# Patient Record
Sex: MALE | Race: WHITE | Employment: UNEMPLOYED | URBAN - NONMETROPOLITAN AREA
[De-identification: names, ages, dates, MRNs, and addresses within clinical notes are randomized per-mention and may not be internally consistent; named-entity substitution may affect disease eponyms.]

---

## 2017-01-09 ENCOUNTER — HOSPITAL ENCOUNTER (OUTPATIENT)
Dept: WOUND CARE | Age: 52
Discharge: HOME | End: 2017-01-09
Attending: PODIATRIST | Admitting: PODIATRIST

## 2017-01-09 VITALS
SYSTOLIC BLOOD PRESSURE: 134 MMHG | RESPIRATION RATE: 18 BRPM | DIASTOLIC BLOOD PRESSURE: 85 MMHG | HEART RATE: 94 BPM | TEMPERATURE: 97.8 F

## 2017-01-09 DIAGNOSIS — L97.412 SKIN ULCER OF RIGHT HEEL WITH FAT LAYER EXPOSED (HCC): Primary | ICD-10-CM

## 2017-01-09 DIAGNOSIS — R52 PAINFUL SCAR: ICD-10-CM

## 2017-01-09 DIAGNOSIS — Z47.89 AFTERCARE FOLLOWING SURGERY OF THE MUSCULOSKELETAL SYSTEM, NEC: ICD-10-CM

## 2017-01-09 DIAGNOSIS — L90.5 PAINFUL SCAR: ICD-10-CM

## 2017-01-09 ASSESSMENT — PAIN SCALES - GENERAL: PAINLEVEL_OUTOF10: 6

## 2017-01-09 ASSESSMENT — PAIN DESCRIPTION - PAIN TYPE: TYPE: CHRONIC PAIN;ACUTE PAIN

## 2017-01-09 ASSESSMENT — PAIN DESCRIPTION - FREQUENCY: FREQUENCY: CONTINUOUS

## 2017-01-09 ASSESSMENT — PAIN DESCRIPTION - ORIENTATION: ORIENTATION: RIGHT

## 2017-01-09 ASSESSMENT — PAIN DESCRIPTION - DESCRIPTORS: DESCRIPTORS: BURNING;ACHING

## 2017-01-09 ASSESSMENT — PAIN DESCRIPTION - LOCATION: LOCATION: FOOT

## 2017-01-16 ENCOUNTER — HOSPITAL ENCOUNTER (OUTPATIENT)
Dept: WOUND CARE | Age: 52
Discharge: HOME | End: 2017-01-16
Attending: PODIATRIST | Admitting: PODIATRIST

## 2017-01-16 VITALS
RESPIRATION RATE: 18 BRPM | SYSTOLIC BLOOD PRESSURE: 148 MMHG | HEART RATE: 75 BPM | TEMPERATURE: 96.2 F | DIASTOLIC BLOOD PRESSURE: 93 MMHG

## 2017-01-16 DIAGNOSIS — Z47.89 AFTERCARE FOLLOWING SURGERY OF THE MUSCULOSKELETAL SYSTEM, NEC: ICD-10-CM

## 2017-01-16 DIAGNOSIS — L90.5 PAINFUL SCAR: ICD-10-CM

## 2017-01-16 DIAGNOSIS — L97.412 SKIN ULCER OF RIGHT HEEL WITH FAT LAYER EXPOSED (HCC): Primary | ICD-10-CM

## 2017-01-16 DIAGNOSIS — R52 PAINFUL SCAR: ICD-10-CM

## 2017-01-16 ASSESSMENT — PAIN DESCRIPTION - ORIENTATION: ORIENTATION: RIGHT

## 2017-01-16 ASSESSMENT — PAIN DESCRIPTION - PAIN TYPE: TYPE: CHRONIC PAIN

## 2017-01-16 ASSESSMENT — PAIN DESCRIPTION - LOCATION: LOCATION: FOOT

## 2017-01-16 ASSESSMENT — PAIN SCALES - GENERAL: PAINLEVEL_OUTOF10: 5

## 2017-01-16 ASSESSMENT — PAIN DESCRIPTION - DESCRIPTORS: DESCRIPTORS: BURNING;ACHING

## 2017-01-16 ASSESSMENT — PAIN DESCRIPTION - FREQUENCY: FREQUENCY: CONTINUOUS

## 2017-01-30 ENCOUNTER — HOSPITAL ENCOUNTER (OUTPATIENT)
Dept: WOUND CARE | Age: 52
Discharge: HOME | End: 2017-01-30
Attending: PODIATRIST | Admitting: PODIATRIST

## 2017-01-30 VITALS
HEART RATE: 80 BPM | DIASTOLIC BLOOD PRESSURE: 86 MMHG | TEMPERATURE: 96 F | RESPIRATION RATE: 18 BRPM | SYSTOLIC BLOOD PRESSURE: 135 MMHG

## 2017-01-30 DIAGNOSIS — R52 PAINFUL SCAR: ICD-10-CM

## 2017-01-30 DIAGNOSIS — L90.5 PAINFUL SCAR: ICD-10-CM

## 2017-01-30 DIAGNOSIS — L97.412 SKIN ULCER OF RIGHT HEEL WITH FAT LAYER EXPOSED (HCC): Primary | ICD-10-CM

## 2017-01-30 DIAGNOSIS — Z47.89 AFTERCARE FOLLOWING SURGERY OF THE MUSCULOSKELETAL SYSTEM, NEC: ICD-10-CM

## 2017-01-30 ASSESSMENT — PAIN DESCRIPTION - DESCRIPTORS: DESCRIPTORS: ACHING;BURNING

## 2017-01-30 ASSESSMENT — PAIN DESCRIPTION - PAIN TYPE: TYPE: CHRONIC PAIN

## 2017-01-30 ASSESSMENT — PAIN DESCRIPTION - FREQUENCY: FREQUENCY: CONTINUOUS

## 2017-01-30 ASSESSMENT — PAIN SCALES - GENERAL: PAINLEVEL_OUTOF10: 3

## 2017-01-30 ASSESSMENT — PAIN DESCRIPTION - ORIENTATION: ORIENTATION: RIGHT

## 2017-02-06 ENCOUNTER — HOSPITAL ENCOUNTER (OUTPATIENT)
Dept: WOUND CARE | Age: 52
Discharge: HOME | End: 2017-02-06
Attending: PODIATRIST | Admitting: PODIATRIST

## 2017-02-06 VITALS — DIASTOLIC BLOOD PRESSURE: 86 MMHG | TEMPERATURE: 96 F | HEART RATE: 71 BPM | SYSTOLIC BLOOD PRESSURE: 133 MMHG

## 2017-02-06 DIAGNOSIS — L97.412 SKIN ULCER OF RIGHT HEEL WITH FAT LAYER EXPOSED (HCC): ICD-10-CM

## 2017-02-06 DIAGNOSIS — L90.5 PAINFUL SCAR: Primary | ICD-10-CM

## 2017-02-06 DIAGNOSIS — R52 PAINFUL SCAR: Primary | ICD-10-CM

## 2017-02-13 ENCOUNTER — HOSPITAL ENCOUNTER (OUTPATIENT)
Dept: WOUND CARE | Age: 52
Discharge: HOME | End: 2017-02-13
Attending: PODIATRIST | Admitting: PODIATRIST

## 2017-02-13 VITALS — DIASTOLIC BLOOD PRESSURE: 73 MMHG | TEMPERATURE: 97.3 F | HEART RATE: 80 BPM | SYSTOLIC BLOOD PRESSURE: 127 MMHG

## 2017-02-13 DIAGNOSIS — Z47.89 AFTERCARE FOLLOWING SURGERY OF THE MUSCULOSKELETAL SYSTEM, NEC: ICD-10-CM

## 2017-02-13 DIAGNOSIS — L90.5 PAINFUL SCAR: ICD-10-CM

## 2017-02-13 DIAGNOSIS — L97.412 SKIN ULCER OF RIGHT HEEL WITH FAT LAYER EXPOSED (HCC): Primary | ICD-10-CM

## 2017-02-13 DIAGNOSIS — R52 PAINFUL SCAR: ICD-10-CM

## 2017-02-20 ENCOUNTER — HOSPITAL ENCOUNTER (OUTPATIENT)
Dept: WOUND CARE | Age: 52
Discharge: HOME | End: 2017-02-20
Attending: PODIATRIST | Admitting: PODIATRIST

## 2017-02-20 VITALS
DIASTOLIC BLOOD PRESSURE: 78 MMHG | TEMPERATURE: 97.9 F | SYSTOLIC BLOOD PRESSURE: 120 MMHG | HEART RATE: 75 BPM | RESPIRATION RATE: 16 BRPM

## 2017-02-20 DIAGNOSIS — L97.412 SKIN ULCER OF RIGHT HEEL WITH FAT LAYER EXPOSED (HCC): ICD-10-CM

## 2017-02-20 DIAGNOSIS — Z47.89 AFTERCARE FOLLOWING SURGERY OF THE MUSCULOSKELETAL SYSTEM, NEC: ICD-10-CM

## 2017-02-20 DIAGNOSIS — L90.5 PAINFUL SCAR: Primary | ICD-10-CM

## 2017-02-20 DIAGNOSIS — R52 PAINFUL SCAR: Primary | ICD-10-CM

## 2017-02-20 ASSESSMENT — PAIN DESCRIPTION - PAIN TYPE: TYPE: ACUTE PAIN

## 2017-02-20 ASSESSMENT — PAIN DESCRIPTION - ONSET: ONSET: ON-GOING

## 2017-02-20 ASSESSMENT — PAIN DESCRIPTION - DESCRIPTORS: DESCRIPTORS: ACHING;BURNING

## 2017-02-20 ASSESSMENT — PAIN DESCRIPTION - LOCATION: LOCATION: FOOT

## 2017-02-20 ASSESSMENT — PAIN DESCRIPTION - ORIENTATION: ORIENTATION: RIGHT

## 2017-02-20 ASSESSMENT — PAIN DESCRIPTION - FREQUENCY: FREQUENCY: CONTINUOUS

## 2017-02-20 ASSESSMENT — PAIN SCALES - GENERAL: PAINLEVEL_OUTOF10: 3

## 2017-03-06 ENCOUNTER — HOSPITAL ENCOUNTER (OUTPATIENT)
Dept: WOUND CARE | Age: 52
Discharge: HOME | End: 2017-03-06
Attending: PODIATRIST | Admitting: PODIATRIST

## 2017-03-06 VITALS — HEART RATE: 82 BPM | RESPIRATION RATE: 16 BRPM | SYSTOLIC BLOOD PRESSURE: 139 MMHG | DIASTOLIC BLOOD PRESSURE: 83 MMHG

## 2017-03-06 DIAGNOSIS — L90.5 PAINFUL SCAR: ICD-10-CM

## 2017-03-06 DIAGNOSIS — Z47.89 AFTERCARE FOLLOWING SURGERY OF THE MUSCULOSKELETAL SYSTEM, NEC: ICD-10-CM

## 2017-03-06 DIAGNOSIS — L97.412 SKIN ULCER OF RIGHT HEEL WITH FAT LAYER EXPOSED (HCC): Primary | ICD-10-CM

## 2017-03-06 DIAGNOSIS — R52 PAINFUL SCAR: ICD-10-CM

## 2017-03-06 ASSESSMENT — PAIN DESCRIPTION - DESCRIPTORS: DESCRIPTORS: BURNING

## 2017-03-06 ASSESSMENT — PAIN SCALES - GENERAL: PAINLEVEL_OUTOF10: 3

## 2017-03-06 ASSESSMENT — PAIN DESCRIPTION - PAIN TYPE: TYPE: ACUTE PAIN;CHRONIC PAIN

## 2017-03-06 ASSESSMENT — PAIN DESCRIPTION - LOCATION: LOCATION: FOOT

## 2017-03-06 ASSESSMENT — PAIN DESCRIPTION - FREQUENCY: FREQUENCY: CONTINUOUS

## 2017-03-06 ASSESSMENT — PAIN DESCRIPTION - ONSET: ONSET: ON-GOING

## 2017-03-06 ASSESSMENT — PAIN DESCRIPTION - ORIENTATION: ORIENTATION: LEFT

## 2017-03-13 ENCOUNTER — HOSPITAL ENCOUNTER (OUTPATIENT)
Dept: WOUND CARE | Age: 52
Discharge: HOME | End: 2017-03-13
Attending: PODIATRIST | Admitting: PODIATRIST

## 2017-03-13 VITALS
DIASTOLIC BLOOD PRESSURE: 93 MMHG | SYSTOLIC BLOOD PRESSURE: 134 MMHG | RESPIRATION RATE: 18 BRPM | TEMPERATURE: 97.2 F | HEART RATE: 77 BPM

## 2017-03-13 DIAGNOSIS — R52 PAINFUL SCAR: Primary | ICD-10-CM

## 2017-03-13 DIAGNOSIS — L90.5 PAINFUL SCAR: Primary | ICD-10-CM

## 2017-03-13 DIAGNOSIS — L97.412 SKIN ULCER OF RIGHT HEEL WITH FAT LAYER EXPOSED (HCC): ICD-10-CM

## 2017-03-20 ENCOUNTER — HOSPITAL ENCOUNTER (OUTPATIENT)
Dept: WOUND CARE | Age: 52
Discharge: HOME | End: 2017-03-20
Attending: PODIATRIST | Admitting: PODIATRIST

## 2017-03-20 VITALS
DIASTOLIC BLOOD PRESSURE: 81 MMHG | HEART RATE: 80 BPM | RESPIRATION RATE: 20 BRPM | SYSTOLIC BLOOD PRESSURE: 120 MMHG | TEMPERATURE: 95.4 F

## 2017-03-20 DIAGNOSIS — R52 PAINFUL SCAR: Primary | ICD-10-CM

## 2017-03-20 DIAGNOSIS — Z47.89 AFTERCARE FOLLOWING SURGERY OF THE MUSCULOSKELETAL SYSTEM, NEC: ICD-10-CM

## 2017-03-20 DIAGNOSIS — L90.5 PAINFUL SCAR: Primary | ICD-10-CM

## 2017-03-20 DIAGNOSIS — L97.412 SKIN ULCER OF RIGHT HEEL WITH FAT LAYER EXPOSED (HCC): ICD-10-CM

## 2017-03-20 ASSESSMENT — PAIN DESCRIPTION - LOCATION: LOCATION: FOOT

## 2017-03-20 ASSESSMENT — PAIN SCALES - GENERAL: PAINLEVEL_OUTOF10: 3

## 2017-03-20 ASSESSMENT — PAIN DESCRIPTION - FREQUENCY: FREQUENCY: CONTINUOUS

## 2017-03-20 ASSESSMENT — PAIN DESCRIPTION - PROGRESSION: CLINICAL_PROGRESSION: NOT CHANGED

## 2017-03-20 ASSESSMENT — PAIN DESCRIPTION - DESCRIPTORS: DESCRIPTORS: BURNING

## 2017-03-20 ASSESSMENT — PAIN DESCRIPTION - PAIN TYPE: TYPE: ACUTE PAIN;CHRONIC PAIN

## 2017-03-20 ASSESSMENT — PAIN DESCRIPTION - ONSET: ONSET: ON-GOING

## 2017-03-20 ASSESSMENT — PAIN DESCRIPTION - ORIENTATION: ORIENTATION: LEFT

## 2017-08-14 ENCOUNTER — OFFICE VISIT (OUTPATIENT)
Dept: PULMONOLOGY | Age: 52
End: 2017-08-14
Payer: MEDICARE

## 2017-08-14 VITALS
BODY MASS INDEX: 43.41 KG/M2 | HEIGHT: 70 IN | WEIGHT: 303.2 LBS | DIASTOLIC BLOOD PRESSURE: 88 MMHG | OXYGEN SATURATION: 96 % | HEART RATE: 76 BPM | SYSTOLIC BLOOD PRESSURE: 136 MMHG

## 2017-08-14 DIAGNOSIS — G47.33 OSA (OBSTRUCTIVE SLEEP APNEA): Primary | ICD-10-CM

## 2017-08-14 PROCEDURE — 1036F TOBACCO NON-USER: CPT | Performed by: INTERNAL MEDICINE

## 2017-08-14 PROCEDURE — G8427 DOCREV CUR MEDS BY ELIG CLIN: HCPCS | Performed by: INTERNAL MEDICINE

## 2017-08-14 PROCEDURE — 3017F COLORECTAL CA SCREEN DOC REV: CPT | Performed by: INTERNAL MEDICINE

## 2017-08-14 PROCEDURE — G8419 CALC BMI OUT NRM PARAM NOF/U: HCPCS | Performed by: INTERNAL MEDICINE

## 2017-08-14 PROCEDURE — 99213 OFFICE O/P EST LOW 20 MIN: CPT | Performed by: INTERNAL MEDICINE

## 2017-08-14 RX ORDER — VIT C/B6/B5/MAGNESIUM/HERB 173 50-5-6-5MG
CAPSULE ORAL 2 TIMES DAILY
COMMUNITY

## 2017-08-15 ENCOUNTER — TELEPHONE (OUTPATIENT)
Dept: SLEEP CENTER | Age: 52
End: 2017-08-15

## 2017-08-20 ENCOUNTER — HOSPITAL ENCOUNTER (OUTPATIENT)
Dept: SLEEP CENTER | Age: 52
Discharge: HOME | End: 2017-08-20
Payer: MEDICARE

## 2017-08-20 DIAGNOSIS — G47.33 OSA (OBSTRUCTIVE SLEEP APNEA): ICD-10-CM

## 2017-08-20 PROCEDURE — 95811 POLYSOM 6/>YRS CPAP 4/> PARM: CPT

## 2017-08-21 LAB — STATUS: NORMAL

## 2017-08-28 DIAGNOSIS — G47.33 OSA (OBSTRUCTIVE SLEEP APNEA): Primary | ICD-10-CM

## 2017-08-31 ENCOUNTER — TELEPHONE (OUTPATIENT)
Dept: SLEEP CENTER | Age: 52
End: 2017-08-31

## 2017-11-07 ENCOUNTER — OFFICE VISIT (OUTPATIENT)
Dept: PULMONOLOGY | Age: 52
End: 2017-11-07
Payer: MEDICARE

## 2017-11-07 VITALS
DIASTOLIC BLOOD PRESSURE: 62 MMHG | HEART RATE: 63 BPM | HEIGHT: 70 IN | SYSTOLIC BLOOD PRESSURE: 110 MMHG | OXYGEN SATURATION: 97 % | WEIGHT: 314.2 LBS | BODY MASS INDEX: 44.98 KG/M2

## 2017-11-07 DIAGNOSIS — Z99.89 OSA ON CPAP: ICD-10-CM

## 2017-11-07 DIAGNOSIS — G47.33 OSA ON CPAP: ICD-10-CM

## 2017-11-07 PROCEDURE — 3017F COLORECTAL CA SCREEN DOC REV: CPT | Performed by: PHYSICIAN ASSISTANT

## 2017-11-07 PROCEDURE — G8427 DOCREV CUR MEDS BY ELIG CLIN: HCPCS | Performed by: PHYSICIAN ASSISTANT

## 2017-11-07 PROCEDURE — G8417 CALC BMI ABV UP PARAM F/U: HCPCS | Performed by: PHYSICIAN ASSISTANT

## 2017-11-07 PROCEDURE — 99213 OFFICE O/P EST LOW 20 MIN: CPT | Performed by: PHYSICIAN ASSISTANT

## 2017-11-07 PROCEDURE — G8484 FLU IMMUNIZE NO ADMIN: HCPCS | Performed by: PHYSICIAN ASSISTANT

## 2017-11-07 PROCEDURE — 1036F TOBACCO NON-USER: CPT | Performed by: PHYSICIAN ASSISTANT

## 2017-11-07 NOTE — PROGRESS NOTES
[place] [facility]      Mehran Garcia                                         [id number]  11/7/2017   Chief Complaint   Patient presents with    Sleep Apnea     2 month fu with download        Pt of Dr. Iram HAYWOOD Download:   Original or initial  AHI: 40.4     Date of initial study: 8/26/16    [x] Compliant  100%   []  Noncompliant 0 %     PAP Type CPAP Autoset   Level  8:20   Avg Hrs/Day 8:51  AHI: 1.0   Recorded compliance dates 9/25/17  to 10/24/17  Machine/Mfg: ResMed  Interface: Nasal mask    Provider:  []-HME  []Artur  []Angel    []Dominic         [][facility] [x]Other: Inell Orange    Neck Size: 22in. Mallampati 4  ESS:  4    Here is a scan of the most recent download:        Presentation:   Frank Ball presents for sleep medicine follow up for obstructive sleep apnea  Since the last visit, Frank Ball is doing reasonably well with their sleep machine. Other comments: His sleep is disrupted but improved. Mostly secondary to leg pain. He is using nasal mask. He has had some congestion and had trouble wearing PAP and would like to use a FFM. He feels like the pressure takes awhile to ramp up. Equipment issues: The pressure is  acceptable, the mask is acceptable and he  is  using the humidity. Sleep issues:  Do you feel better? Yes  More rested? Yes   Better concentration? yes    Progress History:   Since last visit any new medical issues? Yes polycythemia   New ER or hospitlal visits? No  Any new or changes in medicines? No  Any new sleep medicines?  No        Past Medical History:   Diagnosis Date    Aftercare following surgery of the musculoskeletal system, NEC 12/5/2016    Alcohol consumption binge drinking     Hypertension     Low testosterone     Obesity     Painful scar 12/5/2016    Skin ulcer of right heel with fat layer exposed ([address] 12/5/2016    Sleep apnea        Past Surgical History:   Procedure Laterality Date    ANKLE FRACTURE SURGERY      rt

## 2017-11-27 ENCOUNTER — TELEPHONE (OUTPATIENT)
Dept: PULMONOLOGY | Age: 52
End: 2017-11-27

## 2017-11-27 NOTE — TELEPHONE ENCOUNTER
11/07/017 progress note faxed to [facility] Khoi MCCARTY (fax confirmed)

## 2018-05-07 ENCOUNTER — OFFICE VISIT (OUTPATIENT)
Dept: PULMONOLOGY | Age: 53
End: 2018-05-07
Payer: MEDICARE

## 2018-05-07 VITALS
SYSTOLIC BLOOD PRESSURE: 128 MMHG | WEIGHT: 315 LBS | HEIGHT: 70 IN | BODY MASS INDEX: 45.1 KG/M2 | HEART RATE: 63 BPM | DIASTOLIC BLOOD PRESSURE: 76 MMHG | OXYGEN SATURATION: 95 %

## 2018-05-07 DIAGNOSIS — G47.33 OSA ON CPAP: Primary | ICD-10-CM

## 2018-05-07 DIAGNOSIS — Z99.89 OSA ON CPAP: Primary | ICD-10-CM

## 2018-05-07 PROCEDURE — 3017F COLORECTAL CA SCREEN DOC REV: CPT | Performed by: PHYSICIAN ASSISTANT

## 2018-05-07 PROCEDURE — 1036F TOBACCO NON-USER: CPT | Performed by: PHYSICIAN ASSISTANT

## 2018-05-07 PROCEDURE — 99213 OFFICE O/P EST LOW 20 MIN: CPT | Performed by: PHYSICIAN ASSISTANT

## 2018-05-07 PROCEDURE — G8417 CALC BMI ABV UP PARAM F/U: HCPCS | Performed by: PHYSICIAN ASSISTANT

## 2018-05-07 PROCEDURE — G8427 DOCREV CUR MEDS BY ELIG CLIN: HCPCS | Performed by: PHYSICIAN ASSISTANT

## 2018-05-07 ASSESSMENT — ENCOUNTER SYMPTOMS
GASTROINTESTINAL NEGATIVE: 1
EYES NEGATIVE: 1
COUGH: 0
SPUTUM PRODUCTION: 0
SORE THROAT: 0
RESPIRATORY NEGATIVE: 1
SHORTNESS OF BREATH: 0
HEARTBURN: 0
NAUSEA: 0
SINUS PAIN: 0
WHEEZING: 0
ORTHOPNEA: 0

## 2018-05-12 ENCOUNTER — NURSE TRIAGE (OUTPATIENT)
Dept: ADMINISTRATIVE | Age: 53
End: 2018-05-12

## 2019-05-06 NOTE — PROGRESS NOTES
[place] [facility] SleepMed[facility]      Cesar Matias         [id number]  5/7/2019   Chief Complaint   Patient presents with    Sleep Apnea     TOLU 12 mo f/u [facility]        Pt of Dr. Lucy Gordillo    PAP Download:   Original or initialAHI: 40.4     Date of initial study: 8/26/16      Compliant  98%     Noncompliant 2 %     PAP Type CPAPLevel  17   Avg Hrs/Day 12:15  AHI: 0.7   Recorded compliance dates , January 31, 2019  to April 30, 2019   Machine/Mfg: ResMed Interface: Nasal     Provider:    __SR-HME           Robert River        __ Salome Quentin    __ Daniela Melena            __[facility] __Adaptive   __Northwest:       [facility]    Neck Size: 22  Mallampati Mallampati 4  ESS:  4  SAQLI: 65    Here is a scan of the most recent download:            Presentation:   Maria Victoria Martin presents for sleep medicine follow up for obstructive sleep apnea  Since the last visit, Maria Victoria Martin is doing well with PAP. His mask is leaking but he is not having dry mouth. He feels rested. He is waking frequently from leak. Equipment issues: The pressure is  acceptable, the mask is acceptable and he  is  using the humidity. Sleep issues:  Do you feel better? Yes  More rested? Yes   Better concentration? yes    Progress History:   Since last visit any new medical issues? No  New ER or hospitlal visits? No  Any new or changes in medicines? No  Any new sleep medicines?  No        Past Medical History:   Diagnosis Date    Aftercare following surgery of the musculoskeletal system, NEC 12/5/2016    Alcohol consumption binge drinking     Hypertension     Low testosterone     Obesity     Painful scar 12/5/2016    Skin ulcer of right heel with fat layer exposed ([address] 12/5/2016    Sleep apnea        Past Surgical History:   Procedure Laterality Date    ANKLE FRACTURE SURGERY      rt leg 08, 09    OTHER SURGICAL HISTORY Right 12/02/2016    Revision of Painful Scar    SHOULDER SURGERY      dislocated 1983       Social History Tobacco Use    Smoking status: Former Smoker     Packs/day: 0.50     Years: 33.00     Pack years: 16.50     Start date: 4/25/1983     Last attempt to quit: 2/23/2016     Years since quitting: 3.2    Smokeless tobacco: Never Used    Tobacco comment: off and on smoker   Substance Use Topics    Alcohol use: Yes     Comment: quit 2-16-16    Drug use: Yes     Types: Marijuana       Allergies   Allergen Reactions    Molds & Smuts        Current Outpatient Medications   Medication Sig Dispense Refill    metFORMIN (GLUCOPHAGE) 1000 MG tablet Take 1,000 mg by mouth 2 times daily (with meals)      atorvastatin (LIPITOR) 20 MG tablet Take 20 mg by mouth daily      escitalopram (LEXAPRO) 20 MG tablet Take 20 mg by mouth daily      loratadine (CLARITIN) 10 MG capsule Take 10 mg by mouth daily      Cholecalciferol (D3-1000 PO) Take by mouth      Omega-3 Fatty Acids (FISH OIL) 1000 MG CAPS Take 3,000 mg by mouth 2 times daily      guaiFENesin 400 MG tablet Take 400 mg by mouth 4 times daily as needed for Cough      CPAP Machine MISC by Does not apply route Please change CPAP pressure to 17 cm H20. 1 each 0    Turmeric 500 MG CAPS Take by mouth 2 times daily       losartan (COZAAR) 50 MG tablet Take 50 mg by mouth daily      aspirin 325 MG tablet Take 325 mg by mouth daily      Multiple Vitamin (MULTI-VITAMIN PO) Take by mouth daily      sertraline (ZOLOFT) 50 MG tablet Take 25 mg by mouth daily       No current facility-administered medications for this visit. Family History   Problem Relation Age of Onset    Diabetes Mother     Heart Disease Mother     Anemia Sister     Mental Illness Brother         Review of Systems -   Review of Systems   Constitutional: Negative for activity change, appetite change, chills and fever. HENT: Negative for congestion and postnasal drip. Eyes: Negative. Respiratory: Negative for cough, chest tightness, shortness of breath, wheezing and stridor. airway pressure therapy with above described pressure. - He  advised to keep goodcompliance with current recommended pressure to get optimal results and clinical improvement  - Recommend 7-9 hours of sleep with PAP  - He was advised to call [employer] company regarding supplies if needed.   -He call my office for earlier appointment if needed for worsening of sleep symptoms.   - He was instructed on weight loss  - Ramiro Heard was educated about my impression and plan. Patient verbalizesunderstanding.   We will see Apolinar Roman back in: 1 year with download    Information added by my medical assistant/LPN was reviewed today         Marisela Allen PA-C, MPAS  5/7/2019

## 2019-05-07 ENCOUNTER — OFFICE VISIT (OUTPATIENT)
Dept: PULMONOLOGY | Age: 54
End: 2019-05-07
Payer: MEDICARE

## 2019-05-07 VITALS
HEIGHT: 70 IN | OXYGEN SATURATION: 94 % | BODY MASS INDEX: 45.1 KG/M2 | SYSTOLIC BLOOD PRESSURE: 120 MMHG | WEIGHT: 315 LBS | DIASTOLIC BLOOD PRESSURE: 84 MMHG | HEART RATE: 83 BPM

## 2019-05-07 DIAGNOSIS — Z99.89 OSA ON CPAP: Primary | ICD-10-CM

## 2019-05-07 DIAGNOSIS — E66.01 OBESITIES, MORBID (HCC): ICD-10-CM

## 2019-05-07 DIAGNOSIS — G47.33 OSA ON CPAP: Primary | ICD-10-CM

## 2019-05-07 PROCEDURE — 99213 OFFICE O/P EST LOW 20 MIN: CPT | Performed by: PHYSICIAN ASSISTANT

## 2019-05-07 PROCEDURE — G8427 DOCREV CUR MEDS BY ELIG CLIN: HCPCS | Performed by: PHYSICIAN ASSISTANT

## 2019-05-07 PROCEDURE — 3017F COLORECTAL CA SCREEN DOC REV: CPT | Performed by: PHYSICIAN ASSISTANT

## 2019-05-07 PROCEDURE — 1036F TOBACCO NON-USER: CPT | Performed by: PHYSICIAN ASSISTANT

## 2019-05-07 PROCEDURE — G8417 CALC BMI ABV UP PARAM F/U: HCPCS | Performed by: PHYSICIAN ASSISTANT

## 2019-05-07 RX ORDER — ESCITALOPRAM OXALATE 20 MG/1
20 TABLET ORAL DAILY
COMMUNITY
End: 2020-05-27

## 2019-05-07 RX ORDER — ATORVASTATIN CALCIUM 20 MG/1
20 TABLET, FILM COATED ORAL DAILY
COMMUNITY

## 2019-05-07 RX ORDER — GUAIFENESIN 400 MG/1
400 TABLET ORAL 4 TIMES DAILY PRN
COMMUNITY

## 2019-05-07 RX ORDER — CHLORAL HYDRATE 500 MG
3000 CAPSULE ORAL 2 TIMES DAILY
COMMUNITY

## 2019-05-07 RX ORDER — LORATADINE 10 MG/1
10 CAPSULE, LIQUID FILLED ORAL DAILY
COMMUNITY

## 2019-05-07 ASSESSMENT — ENCOUNTER SYMPTOMS
EYES NEGATIVE: 1
SHORTNESS OF BREATH: 0
NAUSEA: 0
STRIDOR: 0
CHEST TIGHTNESS: 0
ALLERGIC/IMMUNOLOGIC NEGATIVE: 1
DIARRHEA: 0
BACK PAIN: 0
COUGH: 0
WHEEZING: 0

## 2020-05-27 ENCOUNTER — OFFICE VISIT (OUTPATIENT)
Dept: PULMONOLOGY | Age: 55
End: 2020-05-27
Payer: MEDICARE

## 2020-05-27 VITALS
HEIGHT: 70 IN | WEIGHT: 313 LBS | DIASTOLIC BLOOD PRESSURE: 74 MMHG | BODY MASS INDEX: 44.81 KG/M2 | OXYGEN SATURATION: 98 % | SYSTOLIC BLOOD PRESSURE: 128 MMHG | HEART RATE: 73 BPM | TEMPERATURE: 98.3 F

## 2020-05-27 PROCEDURE — 1036F TOBACCO NON-USER: CPT | Performed by: PHYSICIAN ASSISTANT

## 2020-05-27 PROCEDURE — 99213 OFFICE O/P EST LOW 20 MIN: CPT | Performed by: PHYSICIAN ASSISTANT

## 2020-05-27 PROCEDURE — G8417 CALC BMI ABV UP PARAM F/U: HCPCS | Performed by: PHYSICIAN ASSISTANT

## 2020-05-27 PROCEDURE — G8427 DOCREV CUR MEDS BY ELIG CLIN: HCPCS | Performed by: PHYSICIAN ASSISTANT

## 2020-05-27 PROCEDURE — 3017F COLORECTAL CA SCREEN DOC REV: CPT | Performed by: PHYSICIAN ASSISTANT

## 2020-05-27 RX ORDER — HYDROCORTISONE ACETATE 0.5 %
1 CREAM (GRAM) TOPICAL 2 TIMES DAILY
COMMUNITY

## 2020-05-27 ASSESSMENT — ENCOUNTER SYMPTOMS
DIARRHEA: 0
NAUSEA: 0
CHEST TIGHTNESS: 0
SHORTNESS OF BREATH: 0
COUGH: 0
STRIDOR: 0
WHEEZING: 0
EYES NEGATIVE: 1
BACK PAIN: 0
ALLERGIC/IMMUNOLOGIC NEGATIVE: 1

## 2020-05-27 NOTE — PROGRESS NOTES
[place] [facility] SleepMed[facility]      Marva Vega         [id number]  5/27/2020   Chief Complaint   Patient presents with    Follow-up     TOLU 1yr f/u [facility]         Pt of Dr. Lucía Patino     PAP Download:   Sebastian Hu or initial AHI: 40.4     Date of initial study: 8/26/16      Compliant  98%     Noncompliant 1 %     PAP Type AirSense 10 Autoset Level  [id number]   Avg Hrs/Day 10hrs 13min  AHI: 0.8   Recorded compliance dates , 2/6/20-5/5/20  Machine/Mfg: ResMed  Interface: nasal    Provider:    __SR-HME           Mohinder Hutchins        __ Silvana Spine    __ Algie Isidoro            __[facility] __Adaptive   __Northwest:       _x_Other    Neck Size: 22  Mallampati Mallampati 4  ESS:  2      Here is a scan of the most recent download:            Presentation:   Lacy Bennett presents for sleep medicine follow up for obstructive sleep apnea  Since the last visit, Lacy Bennett is doing well with PAP. Complaining of mask leak. He is sleeping ok but having some insomnia related to family issues. He denies dry mouth. Equipment issues: The pressure is  acceptable, the mask is acceptable     Sleep issues:  Do you feel better? Yes and No  More rested? Sometimes   Better concentration? yes    Progress History:   Since last visit any new medical issues? No  New ER or hospitlal visits? No  Any new or changes in medicines? No  Any new sleep medicines?  No        Past Medical History:   Diagnosis Date    Aftercare following surgery of the musculoskeletal system, NEC 12/5/2016    Alcohol consumption binge drinking     Hypertension     Low testosterone     Obesity     Painful scar 12/5/2016    Polycythemia 2019    Skin ulcer of right heel with fat layer exposed ([address] 12/5/2016    Sleep apnea        Past Surgical History:   Procedure Laterality Date    ANKLE FRACTURE SURGERY      rt leg 08, 09    OTHER SURGICAL HISTORY Right 12/02/2016    Revision of Painful Scar    SHOULDER SURGERY      dislocated 1983       Social History Tobacco Use    Smoking status: Former Smoker     Packs/day: 0.50     Years: 33.00     Pack years: 16.50     Start date: 1983     Last attempt to quit: 2016     Years since quittin.2    Smokeless tobacco: Never Used    Tobacco comment: off and on smoker   Substance Use Topics    Alcohol use: Yes     Comment: quit 16    Drug use: Yes     Types: Marijuana       Allergies   Allergen Reactions    Molds & Smuts        Current Outpatient Medications   Medication Sig Dispense Refill    buPROPion HCl (WELLBUTRIN PO) Take 50 mg by mouth 2 times daily      Glucosamine-Chondroit-Vit C-Mn (GLUCOSAMINE CHONDR 1500 COMPLX) CAPS Take 1 tablet by mouth 2 times daily      metFORMIN (GLUCOPHAGE) 1000 MG tablet Take 1,000 mg by mouth 2 times daily (with meals)      atorvastatin (LIPITOR) 20 MG tablet Take 20 mg by mouth daily      loratadine (CLARITIN) 10 MG capsule Take 10 mg by mouth daily      Cholecalciferol (D3-1000 PO) Take 2,000 Units by mouth 2 times daily       Omega-3 Fatty Acids (FISH OIL) 1000 MG CAPS Take 3,000 mg by mouth 2 times daily      guaiFENesin 400 MG tablet Take 400 mg by mouth 4 times daily as needed for Cough      CPAP Machine MISC by Does not apply route Please change CPAP pressure to 17 cm H20. 1 each 0    Turmeric 500 MG CAPS Take by mouth 2 times daily       losartan (COZAAR) 50 MG tablet Take 50 mg by mouth daily      aspirin 325 MG tablet Take 325 mg by mouth daily      Multiple Vitamin (MULTI-VITAMIN PO) Take by mouth daily       No current facility-administered medications for this visit. Family History   Problem Relation Age of Onset    Diabetes Mother     Heart Disease Mother     Anemia Sister     Mental Illness Brother         Review of Systems -   Review of Systems   Constitutional: Negative for activity change, appetite change, chills and fever. HENT: Negative for congestion and postnasal drip. Eyes: Negative.     Respiratory: Negative for cough, chest tightness, shortness of breath, wheezing and stridor. Cardiovascular: Negative for chest pain and leg swelling. Gastrointestinal: Negative for diarrhea and nausea. Endocrine: Negative. Genitourinary: Negative. Musculoskeletal: Negative. Negative for arthralgias and back pain. Skin: Negative. Allergic/Immunologic: Negative. Neurological: Negative. Negative for dizziness and light-headedness. Psychiatric/Behavioral: Negative. All other systems reviewed and are negative. Physical Exam:    BMI:  Body mass index is 44.91 kg/m². Wt Readings from Last 3 Encounters:   05/27/20 (!) 313 lb (142 kg)   05/06/19 (!) 341 lb (154.7 kg)   05/07/18 (!) 322 lb (146.1 kg)     Weight lost 30 lbs over 1 year  Vitals: /74 (Site: Left Lower Arm, Position: Sitting, Cuff Size: Medium Adult)   Pulse 73   Temp 98.3 °F (36.8 °C) (Oral)   Ht 5' 10\" (1.778 m)   Wt (!) 313 lb (142 kg)   SpO2 98%   BMI 44.91 kg/m²       Physical Exam  Constitutional:       Appearance: He is well-developed. HENT:      Head: Normocephalic and atraumatic. Right Ear: External ear normal.      Left Ear: External ear normal.   Eyes:      Conjunctiva/sclera: Conjunctivae normal.      Pupils: Pupils are equal, round, and reactive to light. Neck:      Musculoskeletal: Normal range of motion and neck supple. Cardiovascular:      Rate and Rhythm: Normal rate and regular rhythm. Heart sounds: Normal heart sounds. Pulmonary:      Effort: Pulmonary effort is normal.      Breath sounds: Normal breath sounds. Musculoskeletal: Normal range of motion. Skin:     General: Skin is warm and dry. Neurological:      Mental Status: He is alert and oriented to person, place, and time. Psychiatric:         Behavior: Behavior normal.         Thought Content: Thought content normal.         Judgment: Judgment normal.           ASSESSMENT/DIAGNOSIS     Diagnosis Orders   1. TOLU on CPAP     2.  Morbid obesity

## 2020-11-30 ENCOUNTER — OFFICE VISIT (OUTPATIENT)
Dept: PULMONOLOGY | Age: 55
End: 2020-11-30
Payer: MEDICARE

## 2020-11-30 VITALS
OXYGEN SATURATION: 98 % | HEART RATE: 65 BPM | SYSTOLIC BLOOD PRESSURE: 134 MMHG | BODY MASS INDEX: 43.67 KG/M2 | HEIGHT: 70 IN | TEMPERATURE: 96.9 F | DIASTOLIC BLOOD PRESSURE: 78 MMHG | WEIGHT: 305 LBS

## 2020-11-30 PROCEDURE — 1036F TOBACCO NON-USER: CPT | Performed by: PHYSICIAN ASSISTANT

## 2020-11-30 PROCEDURE — G8417 CALC BMI ABV UP PARAM F/U: HCPCS | Performed by: PHYSICIAN ASSISTANT

## 2020-11-30 PROCEDURE — 3017F COLORECTAL CA SCREEN DOC REV: CPT | Performed by: PHYSICIAN ASSISTANT

## 2020-11-30 PROCEDURE — 99213 OFFICE O/P EST LOW 20 MIN: CPT | Performed by: PHYSICIAN ASSISTANT

## 2020-11-30 PROCEDURE — G8484 FLU IMMUNIZE NO ADMIN: HCPCS | Performed by: PHYSICIAN ASSISTANT

## 2020-11-30 PROCEDURE — G8427 DOCREV CUR MEDS BY ELIG CLIN: HCPCS | Performed by: PHYSICIAN ASSISTANT

## 2020-11-30 ASSESSMENT — ENCOUNTER SYMPTOMS
STRIDOR: 0
WHEEZING: 0
BACK PAIN: 0
EYES NEGATIVE: 1
NAUSEA: 0
ALLERGIC/IMMUNOLOGIC NEGATIVE: 1
DIARRHEA: 0
CHEST TIGHTNESS: 0
COUGH: 0
SHORTNESS OF BREATH: 0

## 2020-11-30 NOTE — PROGRESS NOTES
[place] [facility] Sleep [facility]      Jorge Alberto Skelton         [id number]  11/30/2020   Chief Complaint   Patient presents with    Follow-up     TOLU 6 month sleep follow up with Melchor Baez download         Pt of Dr. Jace Eid     PAP Download:   Original or initial AHI: 40.4      Date of initial study: 8/26/16      Compliant  100%     Noncompliant 0 %     PAP Type airsense 10 Level  17 cmh2o    Avg Hrs/Day 9:22  AHI: 0.6   Recorded compliance dates ,10/24/20-11/22/20  Machine/Mfg:   [x] ResMed    [] Respironics/Dreamstation   Interface:   [x] Nasal    [] Nasal pillows   [] FFM      Provider:      [] SR-HME     [][employer]     [] [employer]    [] [address]    [] [place]               [] [facility]      [] [employer]    [] [address]      [x] Other    Neck Size: 22  Mallampati Mallampati 4  ESS:  4  SAQLI: 84    Here is a scan of the most recent download:      Presentation:   Anamaria Wisdom presents for sleep medicine follow up for obstructive sleep apnea  Since the last visit, Anamaria Wisdom is doing well with PAP. His mask is fitting better. He is sleeping better. He feels rested. Equipment issues: The pressure is  acceptable, the mask is acceptable     Sleep issues:  Do you feel better? Yes  More rested? Yes   Better concentration? yes    Progress History:   Since last visit any new medical issues? No  New ER or hospitlal visits? No  Any new or changes in medicines? No  Any new sleep medicines?  No        Past Medical History:   Diagnosis Date    Aftercare following surgery of the musculoskeletal system, NEC 12/5/2016    Alcohol consumption binge drinking     Hypertension     Low testosterone     Obesity     Painful scar 12/5/2016    Polycythemia 2019    Skin ulcer of right heel with fat layer exposed ([address] 12/5/2016    Sleep apnea        Past Surgical History:   Procedure Laterality Date    ANKLE FRACTURE SURGERY      rt leg 08, 09    OTHER SURGICAL HISTORY Right 12/02/2016    Revision of Painful Scar  SHOULDER SURGERY      dislocated        Social History     Tobacco Use    Smoking status: Former Smoker     Packs/day: 0.50     Years: 33.00     Pack years: 16.50     Start date: 1983     Last attempt to quit: 2016     Years since quittin.7    Smokeless tobacco: Never Used    Tobacco comment: off and on smoker   Substance Use Topics    Alcohol use: Yes     Comment: quit 16    Drug use: Yes     Types: Marijuana       Allergies   Allergen Reactions    Molds & Smuts        Current Outpatient Medications   Medication Sig Dispense Refill    buPROPion HCl (WELLBUTRIN PO) Take 50 mg by mouth 2 times daily      Glucosamine-Chondroit-Vit C-Mn (GLUCOSAMINE CHONDR 1500 COMPLX) CAPS Take 1 tablet by mouth 2 times daily      metFORMIN (GLUCOPHAGE) 1000 MG tablet Take 1,000 mg by mouth 2 times daily (with meals)      atorvastatin (LIPITOR) 20 MG tablet Take 20 mg by mouth daily      loratadine (CLARITIN) 10 MG capsule Take 10 mg by mouth daily      Cholecalciferol (D3-1000 PO) Take 2,000 Units by mouth 2 times daily       Omega-3 Fatty Acids (FISH OIL) 1000 MG CAPS Take 3,000 mg by mouth 2 times daily      guaiFENesin 400 MG tablet Take 400 mg by mouth 4 times daily as needed for Cough      CPAP Machine MISC by Does not apply route Please change CPAP pressure to 17 cm H20. 1 each 0    Turmeric 500 MG CAPS Take by mouth 2 times daily       losartan (COZAAR) 50 MG tablet Take 50 mg by mouth daily      aspirin 325 MG tablet Take 325 mg by mouth daily      Multiple Vitamin (MULTI-VITAMIN PO) Take by mouth daily       No current facility-administered medications for this visit. Family History   Problem Relation Age of Onset    Diabetes Mother     Heart Disease Mother     Anemia Sister     Mental Illness Brother         Review of Systems -   Review of Systems   Constitutional: Negative for activity change, appetite change, chills and fever.    HENT: Negative for congestion and ASSESSMENT/DIAGNOSIS     Diagnosis Orders   1. TOLU on CPAP     2. Morbid obesity with BMI of 40.0-44.9, adult [facility])              Plan   Do you need any equipment today? Yes   - Download reviewed and discussed with patient  - He  was advised to continue current positive airway pressure therapy with above described pressure. - He  advised to keep good compliance with current recommended pressure to get optimal results and clinical improvement  - Recommend 7-9 hours of sleep with PAP  - He was advised to call [employer] company regarding supplies if needed.   -He call my office for earlier appointment if needed for worsening of sleep symptoms.   - He was instructed on weight loss  - Daysi England was educated about my impression and plan. Patient verbalizesunderstanding.   We will see Nazario Ramirez back in: 1 year with download    Information added by my medical assistant/LPN was reviewed today         Lakshmi Acosta PA-C, MPAS  11/30/2020

## 2021-11-29 ENCOUNTER — OFFICE VISIT (OUTPATIENT)
Dept: PULMONOLOGY | Age: 56
End: 2021-11-29
Payer: MEDICARE

## 2021-11-29 VITALS
TEMPERATURE: 97.8 F | HEART RATE: 86 BPM | OXYGEN SATURATION: 97 % | SYSTOLIC BLOOD PRESSURE: 136 MMHG | BODY MASS INDEX: 44.98 KG/M2 | HEIGHT: 70 IN | WEIGHT: 314.2 LBS | DIASTOLIC BLOOD PRESSURE: 72 MMHG

## 2021-11-29 DIAGNOSIS — Z99.89 OSA ON CPAP: Primary | ICD-10-CM

## 2021-11-29 DIAGNOSIS — G47.09 OTHER INSOMNIA: ICD-10-CM

## 2021-11-29 DIAGNOSIS — G47.33 OSA ON CPAP: Primary | ICD-10-CM

## 2021-11-29 DIAGNOSIS — E66.01 MORBID OBESITY WITH BMI OF 45.0-49.9, ADULT (HCC): ICD-10-CM

## 2021-11-29 PROCEDURE — 3017F COLORECTAL CA SCREEN DOC REV: CPT | Performed by: PHYSICIAN ASSISTANT

## 2021-11-29 PROCEDURE — 99213 OFFICE O/P EST LOW 20 MIN: CPT | Performed by: PHYSICIAN ASSISTANT

## 2021-11-29 PROCEDURE — G8417 CALC BMI ABV UP PARAM F/U: HCPCS | Performed by: PHYSICIAN ASSISTANT

## 2021-11-29 PROCEDURE — 1036F TOBACCO NON-USER: CPT | Performed by: PHYSICIAN ASSISTANT

## 2021-11-29 PROCEDURE — G8427 DOCREV CUR MEDS BY ELIG CLIN: HCPCS | Performed by: PHYSICIAN ASSISTANT

## 2021-11-29 PROCEDURE — G8484 FLU IMMUNIZE NO ADMIN: HCPCS | Performed by: PHYSICIAN ASSISTANT

## 2021-11-29 ASSESSMENT — ENCOUNTER SYMPTOMS
WHEEZING: 0
EYES NEGATIVE: 1
CHEST TIGHTNESS: 0
DIARRHEA: 0
SHORTNESS OF BREATH: 0
COUGH: 0
STRIDOR: 0
NAUSEA: 0
ALLERGIC/IMMUNOLOGIC NEGATIVE: 1
BACK PAIN: 0

## 2021-11-29 NOTE — PROGRESS NOTES
[place] [facility] Sleep [facility]      Saige Orlando         [id number]  11/29/2021   Chief Complaint   Patient presents with    Follow-up     iris 12 mo fu        Pt of Dr. Dawood Ballard    PAP Download:   Original or initial AHI: 40.4     Date of initial study: 8/26/2016      Compliant 100 %     Noncompliant 0 %     PAP Type Air sense Level  17   Avg Hrs/Day 9 hours and 10 min  AHI: 0.7   Recorded compliance dates , 10/27/21  to 11/25/21   Machine/Mfg:   [x] ResMed    [] Respironics/Dreamstation   Interface:   [x] Nasal    [] Nasal pillows   [] FFM      Provider:      [] SR-HME     [][employer]     [] [employer]    [] Roscoe Vides    [] [place]               [] [facility]      [] [employer]    [] [address]      [x] Other    Neck Size: 22 Mallampati Mallampati 4  ESS:  2  SAQLI: 74    Here is a scan of the most recent download:            Presentation:   Sydney Jacobsen presents for sleep medicine follow up for obstructive sleep apnea  Since the last visit, Sydney Jacobsen is doing well with PAP. He is staying with his brother and has not been sleeping as well. He has taken Melatonin in the past with some benefit. He feels mostly rested. Equipment issues: The pressure is  acceptable, the mask is acceptable     Sleep issues:  Do you feel better? Yes  More rested? Yes   Better concentration? yes    Progress History:   Since last visit any new medical issues? Yes foot wound  New ER or hospital visits? No  Any new or changes in medicines? No  Any new sleep medicines? No    Review of Systems -   Review of Systems   Constitutional: Negative for activity change, appetite change, chills and fever. HENT: Negative for congestion and postnasal drip. Eyes: Negative. Respiratory: Negative for cough, chest tightness, shortness of breath, wheezing and stridor. Cardiovascular: Negative for chest pain and leg swelling. Gastrointestinal: Negative for diarrhea and nausea. Endocrine: Negative. Genitourinary: Negative. Musculoskeletal: Negative. Negative for arthralgias and back pain. Skin: Negative. Allergic/Immunologic: Negative. Neurological: Negative. Negative for dizziness and light-headedness. Psychiatric/Behavioral: Negative. All other systems reviewed and are negative. Physical Exam:    BMI:  Body mass index is 45.08 kg/m². Wt Readings from Last 3 Encounters:   11/29/21 (!) 314 lb 3.2 oz (142.5 kg)   11/30/20 (!) 305 lb (138.3 kg)   05/27/20 (!) 313 lb (142 kg)     Weight stable / unchanged  Vitals: /72 (Site: Left Upper Arm, Position: Sitting, Cuff Size: Large Adult)   Pulse 86   Temp 97.8 °F (36.6 °C)   Ht 5' 10\" (1.778 m)   Wt (!) 314 lb 3.2 oz (142.5 kg)   SpO2 97% Comment: on r/a  BMI 45.08 kg/m²       Physical Exam  Constitutional:       Appearance: He is well-developed. HENT:      Head: Normocephalic and atraumatic. Right Ear: External ear normal.      Left Ear: External ear normal.   Eyes:      Conjunctiva/sclera: Conjunctivae normal.      Pupils: Pupils are equal, round, and reactive to light. Cardiovascular:      Rate and Rhythm: Normal rate and regular rhythm. Heart sounds: Normal heart sounds. Pulmonary:      Effort: Pulmonary effort is normal.      Breath sounds: Normal breath sounds. Musculoskeletal:         General: Normal range of motion. Cervical back: Normal range of motion and neck supple. Skin:     General: Skin is warm and dry. Neurological:      Mental Status: He is alert and oriented to person, place, and time. Psychiatric:         Behavior: Behavior normal.         Thought Content: Thought content normal.         Judgment: Judgment normal.           ASSESSMENT/DIAGNOSIS     Diagnosis Orders   1. TOLU on CPAP     2. Morbid obesity with BMI of 45.0-49.9, adult ([address]     3. Other insomnia              Plan   Do you need any equipment today?  Yes update supplies  - use Melatonin for insomnia  - If no improvement call office  - Reyna Ybarra reviewed and discussed with patient  - He  was advised to continue current positive airway pressure therapy with above described pressure. - He  advised to keep good compliance with current recommended pressure to get optimal results and clinical improvement  - Recommend 7-9 hours of sleep with PAP  - He was advised to call [employer] regarding supplies if needed.   -He call my office for earlier appointment if needed for worsening of sleep symptoms.   - He was instructed on weight loss  - Christine Korytanvir was educated about my impression and plan. Patient verbalizesunderstanding.   We will see Dania Torres back in: 1 year with download    Information added by my medical assistant/LPN was reviewed today         Jen Lee PA-C, MPAS  11/29/2021

## 2021-12-23 NOTE — PROGRESS NOTES
Left message with the following instructions:     Surgery Date: 12/27/21                                    Arrive at: [address]  Surgery time: 1115     If your arrival time is before 7 AM come in the emergency room entrance. If after 7 AM come in the main entrance. One visitor may accompany you to the hospital.  Both of you must wear a mask and be free of covid symptoms. 1. Do not eat or drink anything after midnight - unless instructed by your doctor prior to surgery. This includes                  no water, chewing gum or mints. 2. Follow your directions as prescribed by the doctor for your procedure and medications. Take the following medications with a small sip of water the morning of: none              3. Check with your Doctor regarding stopping Plavix, Coumadin, Lovenox, Effient, Pradaxa, Xarelto, Fragmin or other blood thinners and                  follow their instructions. 4. Do not smoke and do not drink any alcoholic beverages 24 hours prior to surgery. 5. You may brush your teeth and gargle the morning of surgery. DO NOT SWALLOW WATER  6. Please wear simple, loose fitting clothing to the hospital.  Plymouth Hendrickson not bring valuables (money, credit cards, checkbooks, etc.) Do not wear any                  makeup (including no eye makeup) or nail polish on your fingers or toes. 7.  DO NOT wear any jewelry or piercings on day of surgery. All body piercing jewelry must be removed. 8.  Take a shower the night before or morning of your procedure, do not apply any lotion, oil or powder. 9. If you have dentures, they will be removed before going to the OR; we will provide you a container. If you wear contact lenses or glasses,                 they will be removed; please bring a case for them. 10. If you  have a Living Will and Durable Power of  for Healthcare, please bring in a copy.            11. Please bring picture ID,  insurance card, paperwork from the doctors office    (H & P, Consent, & card for implantable devices). 12. You MUST make arrangements for a responsible adult to take you home after your surgery and be able to check on you every couple                  hours for the day. You will not be allowed to leave alone or drive yourself home. It is strongly suggested someone stay with you the first 24                  hrs. Your surgery will be cancelled if you do not have a ride home. 13. Wear a mask covering your nose & mouth when entering the hospital. Have your covid-19 test performed within 10 days of your                 surgery. Quarantine yourself after the test until after your surgery. Please call [phone] with any questions.

## 2021-12-27 ENCOUNTER — HOSPITAL ENCOUNTER (OUTPATIENT)
Age: 56
Setting detail: OUTPATIENT SURGERY
Discharge: HOME | End: 2021-12-27
Attending: PODIATRIST | Admitting: PODIATRIST
Payer: MEDICARE

## 2021-12-27 VITALS
RESPIRATION RATE: 18 BRPM | DIASTOLIC BLOOD PRESSURE: 71 MMHG | HEART RATE: 67 BPM | OXYGEN SATURATION: 96 % | TEMPERATURE: 98 F | BODY MASS INDEX: 46.21 KG/M2 | HEIGHT: 69 IN | WEIGHT: 312 LBS | SYSTOLIC BLOOD PRESSURE: 127 MMHG

## 2021-12-27 DIAGNOSIS — Z48.89 OTHER SPECIFIED AFTERCARE FOLLOWING SURGERY: Primary | ICD-10-CM

## 2021-12-27 LAB
GLUCOSE BLD-MCNC: 99 MG/DL (ref 70–99)
SARS-COV-2, NAAT: DETECTED
SOURCE: ABNORMAL

## 2021-12-27 PROCEDURE — 2709999900 HC NON-CHARGEABLE SUPPLY: Performed by: PODIATRIST

## 2021-12-27 PROCEDURE — 7100000010 HC PHASE II RECOVERY - FIRST 15 MIN: Performed by: PODIATRIST

## 2021-12-27 PROCEDURE — 3600000003 HC SURGERY LEVEL 3 BASE: Performed by: PODIATRIST

## 2021-12-27 PROCEDURE — 2780000010 HC IMPLANT OTHER: Performed by: PODIATRIST

## 2021-12-27 PROCEDURE — 7100000011 HC PHASE II RECOVERY - ADDTL 15 MIN: Performed by: PODIATRIST

## 2021-12-27 PROCEDURE — 82962 GLUCOSE BLOOD TEST: CPT

## 2021-12-27 PROCEDURE — 2500000003 HC RX 250 WO HCPCS: Performed by: PODIATRIST

## 2021-12-27 PROCEDURE — 87635 SARS-COV-2 COVID-19 AMP PRB: CPT

## 2021-12-27 PROCEDURE — 2580000003 HC RX 258: Performed by: PODIATRIST

## 2021-12-27 PROCEDURE — 3600000013 HC SURGERY LEVEL 3 ADDTL 15MIN: Performed by: PODIATRIST

## 2021-12-27 RX ORDER — CEPHALEXIN 500 MG/1
500 CAPSULE ORAL 2 TIMES DAILY
Qty: 20 CAPSULE | Refills: 0 | Status: SHIPPED | OUTPATIENT
Start: 2021-12-27

## 2021-12-27 RX ORDER — HYDROCODONE BITARTRATE AND ACETAMINOPHEN 5; 325 MG/1; MG/1
1 TABLET ORAL EVERY 4 HOURS PRN
Qty: 30 TABLET | Refills: 0 | Status: SHIPPED | OUTPATIENT
Start: 2021-12-27 | End: 2022-01-03

## 2021-12-27 RX ORDER — BUPIVACAINE HYDROCHLORIDE AND EPINEPHRINE 5; 5 MG/ML; UG/ML
INJECTION, SOLUTION EPIDURAL; INTRACAUDAL; PERINEURAL
Status: COMPLETED | OUTPATIENT
Start: 2021-12-27 | End: 2021-12-27

## 2021-12-27 RX ORDER — SODIUM CHLORIDE, SODIUM LACTATE, POTASSIUM CHLORIDE, CALCIUM CHLORIDE 600; 310; 30; 20 MG/100ML; MG/100ML; MG/100ML; MG/100ML
INJECTION, SOLUTION INTRAVENOUS CONTINUOUS
Status: DISCONTINUED | OUTPATIENT
Start: 2021-12-27 | End: 2021-12-27

## 2021-12-27 RX ORDER — SODIUM CHLORIDE 0.9 % (FLUSH) 0.9 %
5-40 SYRINGE (ML) INJECTION PRN
Status: DISCONTINUED | OUTPATIENT
Start: 2021-12-27 | End: 2021-12-27 | Stop reason: HOSPADM

## 2021-12-27 RX ADMIN — SODIUM CHLORIDE, PRESERVATIVE FREE 10 ML: 5 INJECTION INTRAVENOUS at 11:03

## 2021-12-27 ASSESSMENT — PAIN SCALES - GENERAL
PAINLEVEL_OUTOF10: 0
PAINLEVEL_OUTOF10: 0

## 2021-12-27 NOTE — BRIEF OP NOTE
Brief Postoperative Note      Patient: Sabrina Rodriguez  YOB: 1965  MRN: [id number]    Date of Procedure: 12/27/2021    Pre-Op Diagnosis: Type 2 diabetes mellitus with diabetic polyneuropathy, unspecified whether long term insulin use ([facility] [address] [E11.42] Ulcer of right heel, with fat layer exposed ([facility] [address] [L97.412]    Post-Op Diagnosis: Same       Procedure(s):  RIGHT PARTIAL EXCISION OF BONE CALCANEUS    Surgeon(s):  Pool Lawrence DPM    Assistant:  * No surgical staff found *    Anesthesia: Local    Estimated Blood Loss (mL): Minimal    Complications: None    Specimens:   * No specimens in log *    Implants:  * No implants in log *      Drains: * No LDAs found *    Findings: partial calcaneal excision without complication, ulceration offloaded, bleeding controlled    Electronically signed by Pool Lawrence DPM on 12/27/2021 at 1:34 PM

## 2021-12-27 NOTE — PROGRESS NOTES
1338- pt. Arrived to [facility]2 via surgical cart. Brakes applied. Pt. Had local anesthetic. Report received from [facility]. Pt. Dressing to right foot is clean dry and intact. He denies pain or n/v at this time. He states does not want anything to drink at this time. Pt. Currently on RA. Pt. Has a saline lock. He denies any needs or questions at this time.

## 2021-12-27 NOTE — PROGRESS NOTES
1450- pt. Is alert and orientedx4. Per Dr. DESIR [facility] pt. Is able to drive himself home. Pt. Is on RA and denies pain or n/v. Dressing to right foot is clean dry and intact. Written and verbal instructions given to patient. He denied any other questions or concerns. Pt. Was sent home with crutches and a surgical shoe. IV removed without complications. Pt. Was taken to main entrance via wheelchair and then was able to use his crutches and walk to his car.

## 2021-12-28 NOTE — OP NOTE
[facility]                  [address], [address]                                OPERATIVE REPORT    PATIENT NAME: Joe Wang                    :        1965  MED REC NO:   [id number]                          ROOM:  ACCOUNT NO:   [de-identified]                           ADMIT DATE: 2021  PROVIDER:     Zeinab Byers DPM    DATE OF PROCEDURE:  2021    SURGEON:  Zeinab Byers DPM    PREOPERATIVE DIAGNOSES:  1. Diabetes mellitus type 2 with polyneuropathy. 2.  Ulcer of right heel with fat layer exposed. 3.  Deformity of right heel. POSTOPERATIVE DIAGNOSES:  1. Diabetes mellitus type 2 with polyneuropathy. 2.  Ulcer of right heel with fat layer exposed. 3.  Deformity of right heel. PROCEDURES:  Partial ostectomy of right calcaneus. PATHOLOGY:  Zero. ANESTHESIA:  Local.    HEMOSTASIS:  Pneumatic ankle tourniquet at 250 mmHg. ESTIMATED BLOOD LOSS:  Less than 10 mL. MATERIALS:  Nylon. INJECTABLES:  10 mL of 0.5% Marcaine with epinephrine preoperative  block. Postoperative block 6 mL 1% lidocaine plain 0.5% Marcaine plain  in a one-to-one mix. COMPLICATIONS:  Zero. INDICATIONS FOR PROCEDURE:  The patient is a pleasant 77-year-old  diabetic male with longstanding chronic ulceration to the right heel. The patient had previous rotational flap closure, which did fully heal;  however, secondary to deformity of the heel bone itself with noted  prominence to the plantar medial posterior calcaneus, ulceration did  recur. The patient did wish for removal, minimally invasive. Clinical  and radiographic evaluation did correlate the above diagnosis. Consent  signed and reviewed in the chart. No guarantees were made as to the  outcome of the procedure.   All the patient's questions and concerns were  answered prior to the start of the procedure and the patient wished to  proceed with the procedure at this time.    PROCEDURE IN DETAIL:  The patient was brought to the operating room,  placed on the operating table in the supine position. A well-padded  pneumatic ankle tourniquet was placed about the patient's right ankle. Following tourniquet application, the right lower extremity was  scrubbed, prepped and draped in the usual aseptic manner. Right leg was  elevated. Pneumatic ankle tourniquet inflated to 250 mmHg. Attention was directed to the patient's right posterior heel where 10 mL  of 0.5% Marcaine with epinephrine was introduced in ring block fashion  around the ulceration in question. Two stab incisions were then created  at approximately 10 o'clock position and the 2 o'clock position, carried  directly to bone. The Tenex system was then introduced into the 10  o'clock position and debridement of the area inferior to the ulceration  did occur. Significant scar tissue release was noted with removal of  bony fragments. It was removed and then placed in the 2 o'clock  position where the process did repeat. This process occurred  repetitively several times over the next four minutes of debridement  time. After four minutes of debridement, the area was palpated and no  prominence was noted inferior to the ulceration. The skin did move  freely and significant scar tissue reduction was noted. The patient did  not have any pain at this time. The area was then dressed with Aquacel  AG, covered with a sterile compressive dressing consisting of 4 x 4s,  Kerlix and a 4-inch Ace bandage. The pneumatic ankle tourniquet was  deflated and a prompt hyperemic response was noted to all digits of the  patient's right foot. The patient tolerated the procedure and  anesthesia well. He was transferred to PACU with vital signs stable and  vascular status intact to the right foot.   After a brief period of  postoperative monitoring, the patient will be discharged home with  written and oral postoperative instructions. Remain strict  non-weightbearing to the right foot, taking all pain medication and  antibiotics as instructed. Any postoperative complications, please call  my office and if my office is unable to reach, please have me paged  directly at my office on-call number.         Job Henry    D: 12/27/2021 13:51:48       T: 12/27/2021 16:35:35     [email or web address]  Job#: [id number]     Doc#: [id number]    CC:

## 2022-11-23 NOTE — PROGRESS NOTES
[facility]      Олег Larose         [id number]  11/28/2022   Chief Complaint   Patient presents with    Follow-up     1yr TOLU f/u w/[employer] download. Had PSG 8/28/16 and 8/20/17 (printed). Notes an 80lb weight loss. \"I don't sleep for shit. I'm not sleeping well. \"  Has tried OTC sleep aids w/o success. Notes he tosses and turns all night. Pt of Dr. Montelongo Gamma     PAP Download:   Kwame Vang or initial AHI: 40.4     Date of initial study: 8/26/2016     Compliant  97%     Noncompliant 3 %     PAP Type CPAP   Level  14clK8L   Avg Hrs/Day 8hrs 25mins  AHI: 7.7   Recorded compliance dates , 10/15/22  to 11/14/22   Machine/Mfg:   [x] ResMed    [] Respironics/Dreamstation   Interface:   [x] Nasal    [] Nasal pillows   [] FFM      Provider:      [] -HME     []Artur     [x] Angel    [] Js Pena    [] Polo[place]ermans               [] [employer]      [] Adaptive    [] [address]      [] Other    Neck Size: 19  Mallampati 3  ESS:  4  SAQLI: 74    Here is a scan of the most recent download:            Presentation:   Reinaldo Mendieta presents for sleep medicine follow up for obstructive sleep apnea  Since the last visit, Reinaldo Mendieta is doing ok with PAP. AHI is elevated with both obstructive and central events. He has lost nearly 90 lbs this past year. He is struggling with insomnia. He feels stress is contributing. Mask is leaking also. Equipment issues: The pressure is  acceptable, the mask is unacceptable     Sleep issues:  Do you feel better? Yes  More rested? Yes   Better concentration? yes    Progress History:   Since last visit any new medical issues? No  New ER or hospital visits? No  Any new or changes in medicines? No  Any new sleep medicines? No    Review of Systems -   Review of Systems   Constitutional:  Positive for unexpected weight change. Negative for activity change, appetite change, chills and fever. HENT:  Negative for congestion and postnasal drip.     Eyes: Negative. Respiratory:  Negative for cough, chest tightness, shortness of breath, wheezing and stridor. Cardiovascular:  Negative for chest pain and leg swelling. Gastrointestinal:  Negative for diarrhea and nausea. Endocrine: Negative. Genitourinary: Negative. Musculoskeletal: Negative. Negative for arthralgias and back pain. Skin: Negative. Allergic/Immunologic: Negative. Neurological: Negative. Negative for dizziness and light-headedness. Psychiatric/Behavioral: Negative. All other systems reviewed and are negative. Physical Exam:    BMI:  Body mass index is 33.29 kg/m². Wt Readings from Last 3 Encounters:   11/28/22 225 lb 6.4 oz (102.2 kg)   12/23/21 (!) 312 lb (141.5 kg)   11/29/21 (!) 314 lb 3.2 oz (142.5 kg)     Weight lost 90 lbs over 1 year  Vitals: /82 (Site: Left Upper Arm, Position: Sitting, Cuff Size: Medium Adult)   Pulse 76   Temp 98.2 °F (36.8 °C) (Oral)   Ht 5' 9\" (1.753 m)   Wt 225 lb 6.4 oz (102.2 kg)   SpO2 97% Comment: r/a  BMI 33.29 kg/m²       Physical Exam  Constitutional:       Appearance: Normal appearance. He is normal weight. HENT:      Head: Normocephalic and atraumatic. Right Ear: External ear normal.      Left Ear: External ear normal.      Nose: Nose normal.   Eyes:      Extraocular Movements: Extraocular movements intact. Conjunctiva/sclera: Conjunctivae normal.      Pupils: Pupils are equal, round, and reactive to light. Pulmonary:      Effort: Pulmonary effort is normal.   Musculoskeletal:      Cervical back: Normal range of motion and neck supple. Neurological:      General: No focal deficit present. Mental Status: He is alert and oriented to person, place, and time. Psychiatric:         Attention and Perception: Attention and perception normal.         Mood and Affect: Mood and affect normal.         Speech: Speech normal.         Behavior: Behavior normal. Behavior is cooperative.          Thought Content: Thought content normal.         Cognition and Memory: Cognition normal.         Judgment: Judgment normal.         ASSESSMENT/DIAGNOSIS     Diagnosis Orders   1. TOLU on CPAP        2. Obesity (BMI 30-39.9)        3. Other insomnia               Plan   Do you need any equipment today? Yes update supplies  - will try auto to improve AHI given weight loss  - Will try trazodone  - needs to follow up with PCP regarding weight loss   - Download reviewed and discussed with patient  - He  was advised to continue current positive airway pressure therapy with above described pressure. - He  advised to keep good compliance with current recommended pressure to get optimal results and clinical improvement  - Recommend 7-9 hours of sleep with PAP  - He was advised to call [employer] regarding supplies if needed.   -He call my office for earlier appointment if needed for worsening of sleep symptoms.   - He was instructed on weight loss  - Joel Abreu was educated about my impression and plan. Patient verbalizesunderstanding.   We will see Gianna Phillips back in: 8 weeks with download    Information added by my medical assistant/LPN was reviewed today       Criselda Montano, [address] [facility] for pulmonary and Sleep Medicine  11/28/2022

## 2022-11-28 ENCOUNTER — OFFICE VISIT (OUTPATIENT)
Dept: PULMONOLOGY | Age: 57
End: 2022-11-28
Payer: MEDICARE

## 2022-11-28 VITALS
OXYGEN SATURATION: 97 % | HEIGHT: 69 IN | DIASTOLIC BLOOD PRESSURE: 82 MMHG | SYSTOLIC BLOOD PRESSURE: 122 MMHG | HEART RATE: 76 BPM | WEIGHT: 225.4 LBS | BODY MASS INDEX: 33.38 KG/M2 | TEMPERATURE: 98.2 F

## 2022-11-28 DIAGNOSIS — G47.09 OTHER INSOMNIA: ICD-10-CM

## 2022-11-28 DIAGNOSIS — E66.9 OBESITY (BMI 30-39.9): ICD-10-CM

## 2022-11-28 DIAGNOSIS — Z99.89 OSA ON CPAP: Primary | ICD-10-CM

## 2022-11-28 DIAGNOSIS — G47.33 OSA ON CPAP: Primary | ICD-10-CM

## 2022-11-28 PROCEDURE — G8427 DOCREV CUR MEDS BY ELIG CLIN: HCPCS | Performed by: PHYSICIAN ASSISTANT

## 2022-11-28 PROCEDURE — 3017F COLORECTAL CA SCREEN DOC REV: CPT | Performed by: PHYSICIAN ASSISTANT

## 2022-11-28 PROCEDURE — 1036F TOBACCO NON-USER: CPT | Performed by: PHYSICIAN ASSISTANT

## 2022-11-28 PROCEDURE — G8417 CALC BMI ABV UP PARAM F/U: HCPCS | Performed by: PHYSICIAN ASSISTANT

## 2022-11-28 PROCEDURE — G8484 FLU IMMUNIZE NO ADMIN: HCPCS | Performed by: PHYSICIAN ASSISTANT

## 2022-11-28 PROCEDURE — 99214 OFFICE O/P EST MOD 30 MIN: CPT | Performed by: PHYSICIAN ASSISTANT

## 2022-11-28 RX ORDER — TRAZODONE HYDROCHLORIDE 50 MG/1
TABLET ORAL
Qty: 60 TABLET | Refills: 2 | Status: SHIPPED | OUTPATIENT
Start: 2022-11-28

## 2022-11-28 ASSESSMENT — ENCOUNTER SYMPTOMS
DIARRHEA: 0
CHEST TIGHTNESS: 0
WHEEZING: 0
STRIDOR: 0
EYES NEGATIVE: 1
SHORTNESS OF BREATH: 0
BACK PAIN: 0
ALLERGIC/IMMUNOLOGIC NEGATIVE: 1
COUGH: 0
NAUSEA: 0

## 2023-01-31 ENCOUNTER — OFFICE VISIT (OUTPATIENT)
Dept: PULMONOLOGY | Age: 58
End: 2023-01-31
Payer: MEDICARE

## 2023-01-31 VITALS
HEIGHT: 74 IN | OXYGEN SATURATION: 96 % | SYSTOLIC BLOOD PRESSURE: 126 MMHG | WEIGHT: 221.4 LBS | BODY MASS INDEX: 28.42 KG/M2 | DIASTOLIC BLOOD PRESSURE: 66 MMHG | TEMPERATURE: 97.9 F | HEART RATE: 86 BPM

## 2023-01-31 DIAGNOSIS — G47.09 OTHER INSOMNIA: ICD-10-CM

## 2023-01-31 DIAGNOSIS — Z99.89 OSA ON CPAP: Primary | ICD-10-CM

## 2023-01-31 DIAGNOSIS — G47.33 OSA ON CPAP: Primary | ICD-10-CM

## 2023-01-31 PROCEDURE — G8484 FLU IMMUNIZE NO ADMIN: HCPCS | Performed by: PHYSICIAN ASSISTANT

## 2023-01-31 PROCEDURE — 99213 OFFICE O/P EST LOW 20 MIN: CPT | Performed by: PHYSICIAN ASSISTANT

## 2023-01-31 PROCEDURE — G8417 CALC BMI ABV UP PARAM F/U: HCPCS | Performed by: PHYSICIAN ASSISTANT

## 2023-01-31 PROCEDURE — 3017F COLORECTAL CA SCREEN DOC REV: CPT | Performed by: PHYSICIAN ASSISTANT

## 2023-01-31 PROCEDURE — 1036F TOBACCO NON-USER: CPT | Performed by: PHYSICIAN ASSISTANT

## 2023-01-31 PROCEDURE — G8427 DOCREV CUR MEDS BY ELIG CLIN: HCPCS | Performed by: PHYSICIAN ASSISTANT

## 2023-01-31 RX ORDER — TRAZODONE HYDROCHLORIDE 50 MG/1
TABLET ORAL
Qty: 60 TABLET | Refills: 2 | Status: SHIPPED | OUTPATIENT
Start: 2023-01-31

## 2023-01-31 ASSESSMENT — ENCOUNTER SYMPTOMS
BACK PAIN: 0
STRIDOR: 0
COUGH: 0
EYES NEGATIVE: 1
NAUSEA: 0
SHORTNESS OF BREATH: 0
CHEST TIGHTNESS: 0
ALLERGIC/IMMUNOLOGIC NEGATIVE: 1
WHEEZING: 0
DIARRHEA: 0

## 2023-01-31 NOTE — PROGRESS NOTES
[facility]      River Rodriguezport         [id number]  1/31/2023   Chief Complaint   Patient presents with    Follow-up     8wk TOLU f/u w/[employer] download. Doing \"ok. \"        Pt of Dr. Jo-Ann Gold     PAP Download:   Alka Blount or initial AHI: 40.4     Date of initial study: 8/26/2016       Compliant  77%     Noncompliant 23 %     PAP Type APAP   Level  10/91sfR1O   Avg Hrs/Day 9hrs 21mins  AHI: 5.6   Recorded compliance dates , 12/25/22  to 1/23/23   Machine/Mfg:   [x] ResMed    [] Respironics/Dreamstation   Interface:   [x] Nasal    [] Nasal pillows   [] FFM      Provider:      [] -AVI     []Artur     [x] Angel    [] Thomes Arn    [] Therese               [] [employer]      [] Adaptive    [] [address]      [] Other    Neck Size: 18  Mallampati 3  ESS:  3  SAQLI: 95    Here is a scan of the most recent download:            Presentation:   Diane Barroso presents for sleep medicine follow up for obstructive sleep apnea  Since the last visit, Diane Barroso is doing well with PAP. He is taking trazodone with some benefit. AHI improved with pressure adjustment. Equipment issues: The pressure is  acceptable, the mask is acceptable     Sleep issues:  Do you feel better? Yes  More rested? Yes   Better concentration? yes    Progress History:   Since last visit any new medical issues? No  New ER or hospital visits? No  Any new or changes in medicines? No  Any new sleep medicines? No    Review of Systems -   Review of Systems   Constitutional:  Negative for activity change, appetite change, chills and fever. HENT:  Negative for congestion and postnasal drip. Eyes: Negative. Respiratory:  Negative for cough, chest tightness, shortness of breath, wheezing and stridor. Cardiovascular:  Negative for chest pain and leg swelling. Gastrointestinal:  Negative for diarrhea and nausea. Endocrine: Negative. Genitourinary: Negative. Musculoskeletal: Negative.   Negative for arthralgias and back pain. Skin: Negative. Allergic/Immunologic: Negative. Neurological: Negative. Negative for dizziness and light-headedness. Psychiatric/Behavioral: Negative. All other systems reviewed and are negative. Physical Exam:    BMI:  Body mass index is 28.43 kg/m². Wt Readings from Last 3 Encounters:   01/31/23 221 lb 6.4 oz (100.4 kg)   11/28/22 225 lb 6.4 oz (102.2 kg)   12/23/21 (!) 312 lb (141.5 kg)     Weight stable / unchanged  Vitals: /66 (Site: Right Upper Arm, Position: Sitting, Cuff Size: Large Adult)   Pulse 86   Temp 97.9 °F (36.6 °C) (Oral)   Ht 6' 2\" (1.88 m)   Wt 221 lb 6.4 oz (100.4 kg)   SpO2 96% Comment: r/a  BMI 28.43 kg/m²       Physical Exam  Constitutional:       Appearance: Normal appearance. He is normal weight. HENT:      Head: Normocephalic and atraumatic. Right Ear: External ear normal.      Left Ear: External ear normal.      Nose: Nose normal.   Eyes:      Extraocular Movements: Extraocular movements intact. Conjunctiva/sclera: Conjunctivae normal.      Pupils: Pupils are equal, round, and reactive to light. Pulmonary:      Effort: Pulmonary effort is normal.   Musculoskeletal:      Cervical back: Normal range of motion and neck supple. Neurological:      General: No focal deficit present. Mental Status: He is alert and oriented to person, place, and time. Psychiatric:         Attention and Perception: Attention and perception normal.         Mood and Affect: Mood and affect normal.         Speech: Speech normal.         Behavior: Behavior normal. Behavior is cooperative. Thought Content: Thought content normal.         Cognition and Memory: Cognition normal.         Judgment: Judgment normal.         ASSESSMENT/DIAGNOSIS     Diagnosis Orders   1. TOLU on CPAP        2. Other insomnia                 Plan   Do you need any equipment today?  No  - continue trazodone  - encourage to increase activity  - Download reviewed and discussed with patient  - He  was advised to continue current positive airway pressure therapy with above described pressure. - He  advised to keep good compliance with current recommended pressure to get optimal results and clinical improvement  - Recommend 7-9 hours of sleep with PAP  - He was advised to call [employer] regarding supplies if needed.   -He call my office for earlier appointment if needed for worsening of sleep symptoms.   - He was instructed on weight loss  - Beauty Severe was educated about my impression and plan. Patient verbalizesunderstanding.   We will see Chantel Stark back in: 6 months  with download    Information added by my medical assistant/LPN was reviewed today       José Miguel Rosario, [address] for pulmonary and Sleep Medicine  1/31/2023

## (undated) DEVICE — TUBING, SUCTION, 1/4" X 10', STRAIGHT: Brand: MEDLINE

## (undated) DEVICE — BANDAGE,SELF ADHRNT,COFLEX,4"X5YD,STRL: Brand: COLABEL

## (undated) DEVICE — CURITY NON-ADHERENT STRIPS: Brand: CURITY

## (undated) DEVICE — SNAP KOVER: Brand: UNBRANDED

## (undated) DEVICE — PENCIL,CAUTERY,ROCKER,PTFE,15'CORD: Brand: MEDLINE INDUSTRIES, INC.

## (undated) DEVICE — SYRINGE IRRIG 60ML SFT PLIABLE BLB EZ TO GRP 1 HND USE W/

## (undated) DEVICE — COUNTER NDL 60 COUNT FOAM STRP SGL MAG

## (undated) DEVICE — BANDAGE,ELASTIC,ESMARK,STERILE,4"X9',LF: Brand: MEDLINE

## (undated) DEVICE — DRESSING AQUACEL ADVANTAGE ALG W4XL5IN RECT GREATER ABSRB HYDRFBR TECHNOLOGY

## (undated) DEVICE — BLADE SURG NO15 C STL SHRP PREM

## (undated) DEVICE — CONVERTORS STOCKINETTE: Brand: CONVERTORS

## (undated) DEVICE — SOLUTION PREP POVIDONE IOD FOR SKIN MUCOUS MEM PRIOR TO

## (undated) DEVICE — TX-BONE (TXB) MICROTIP: Brand: TENEX HEALTH TX®

## (undated) DEVICE — SPONGE LAP W18XL18IN WHT COT 4 PLY FLD STRUNG RADPQ DISP ST

## (undated) DEVICE — SPONGE GZ W4XL8IN COT WVN 12 PLY

## (undated) DEVICE — TOWEL,OR,DSP,ST,BLUE,STD,6/PK,12PK/CS: Brand: MEDLINE

## (undated) DEVICE — NEEDLE HYPO 25GA L1.5IN BLU POLYPR HUB S STL REG BVL STR

## (undated) DEVICE — BANDAGE COMPR W4INXL5YD WHT BGE POLY COT M E WRP WV HK AND

## (undated) DEVICE — GLOVE ORANGE PI 8   MSG9080

## (undated) DEVICE — PACK,BASIC,IX: Brand: MEDLINE

## (undated) DEVICE — GAUZE,SPONGE,4"X4",16PLY,XRAY,STRL,LF: Brand: MEDLINE

## (undated) DEVICE — ZIMMER® STERILE DISPOSABLE TOURNIQUET CUFF WITH PLC, DUAL PORT, SINGLE BLADDER, 18 IN. (46 CM)

## (undated) DEVICE — GLOVE SURG SZ 65 THK91MIL LTX FREE SYN POLYISOPRENE

## (undated) DEVICE — MARKER SURG SKIN UTIL REGULAR/FINE 2 TIP W/ RUL AND 9 LBL

## (undated) DEVICE — DRAPE,EXTREMITY,89X128,STERILE: Brand: MEDLINE

## (undated) DEVICE — SYRINGE MED 10ML LUERLOCK TIP W/O SFTY DISP

## (undated) DEVICE — CONTAINER,SPECIMEN,OR STERILE,4OZ: Brand: MEDLINE